# Patient Record
Sex: FEMALE | Race: WHITE | URBAN - METROPOLITAN AREA
[De-identification: names, ages, dates, MRNs, and addresses within clinical notes are randomized per-mention and may not be internally consistent; named-entity substitution may affect disease eponyms.]

---

## 2024-07-18 ENCOUNTER — TELEPHONE (OUTPATIENT)
Dept: FAMILY MEDICINE CLINIC | Facility: CLINIC | Age: 89
End: 2024-07-18

## 2024-07-18 ENCOUNTER — HOSPITAL ENCOUNTER (OUTPATIENT)
Facility: HOSPITAL | Age: 89
Setting detail: SPECIMEN
Discharge: HOME | End: 2024-07-18
Payer: MEDICARE

## 2024-07-18 ENCOUNTER — OFFICE VISIT (OUTPATIENT)
Dept: FAMILY MEDICINE CLINIC | Facility: CLINIC | Age: 89
End: 2024-07-18

## 2024-07-18 VITALS
HEART RATE: 91 BPM | SYSTOLIC BLOOD PRESSURE: 135 MMHG | BODY MASS INDEX: 27.64 KG/M2 | HEIGHT: 63 IN | DIASTOLIC BLOOD PRESSURE: 91 MMHG | RESPIRATION RATE: 16 BRPM | TEMPERATURE: 97.3 F | WEIGHT: 156 LBS | OXYGEN SATURATION: 98 %

## 2024-07-18 DIAGNOSIS — I48.91 ATRIAL FIBRILLATION, UNSPECIFIED TYPE (HCC): Primary | ICD-10-CM

## 2024-07-18 DIAGNOSIS — Z13.29 SCREENING FOR ENDOCRINE DISORDER: ICD-10-CM

## 2024-07-18 DIAGNOSIS — I48.91 ATRIAL FIBRILLATION, UNSPECIFIED TYPE (HCC): ICD-10-CM

## 2024-07-18 DIAGNOSIS — Z91.81 AT HIGH RISK FOR FALLS: ICD-10-CM

## 2024-07-18 DIAGNOSIS — Z76.89 ESTABLISHING CARE WITH NEW DOCTOR, ENCOUNTER FOR: Primary | ICD-10-CM

## 2024-07-18 DIAGNOSIS — I10 PRIMARY HYPERTENSION: ICD-10-CM

## 2024-07-18 DIAGNOSIS — E03.9 HYPOTHYROIDISM, UNSPECIFIED TYPE: ICD-10-CM

## 2024-07-18 DIAGNOSIS — Z13.6 SCREENING FOR CARDIOVASCULAR CONDITION: ICD-10-CM

## 2024-07-18 DIAGNOSIS — Z76.89 ESTABLISHING CARE WITH NEW DOCTOR, ENCOUNTER FOR: ICD-10-CM

## 2024-07-18 LAB
ANION GAP SERPL CALC-SCNC: 9 MMOL/L (ref 3–18)
BUN SERPL-MCNC: 17 MG/DL (ref 7–18)
BUN/CREAT SERPL: 16 (ref 12–20)
CALCIUM SERPL-MCNC: 9.8 MG/DL (ref 8.5–10.1)
CHLORIDE SERPL-SCNC: 105 MMOL/L (ref 100–111)
CHOLEST SERPL-MCNC: 212 MG/DL
CO2 SERPL-SCNC: 25 MMOL/L (ref 21–32)
CREAT SERPL-MCNC: 1.05 MG/DL (ref 0.6–1.3)
CREAT UR-MCNC: 55 MG/DL (ref 30–125)
ERYTHROCYTE [DISTWIDTH] IN BLOOD BY AUTOMATED COUNT: 13.7 % (ref 11.6–14.5)
EST. AVERAGE GLUCOSE BLD GHB EST-MCNC: 137 MG/DL
GLUCOSE SERPL-MCNC: 145 MG/DL (ref 74–99)
HBA1C MFR BLD: 6.4 % (ref 4.2–5.6)
HCT VFR BLD AUTO: 46.8 % (ref 35–45)
HDLC SERPL-MCNC: 40 MG/DL (ref 40–60)
HDLC SERPL: 5.3 (ref 0–5)
HGB BLD-MCNC: 15.2 G/DL (ref 12–16)
LDLC SERPL CALC-MCNC: 119.6 MG/DL (ref 0–100)
LIPID PANEL: ABNORMAL
MCH RBC QN AUTO: 31.2 PG (ref 24–34)
MCHC RBC AUTO-ENTMCNC: 32.5 G/DL (ref 31–37)
MCV RBC AUTO: 96.1 FL (ref 78–100)
MICROALBUMIN UR-MCNC: 1.87 MG/DL (ref 0–3)
MICROALBUMIN/CREAT UR-RTO: 34 MG/G (ref 0–30)
NRBC # BLD: 0 K/UL (ref 0–0.01)
NRBC BLD-RTO: 0 PER 100 WBC
PLATELET # BLD AUTO: 267 K/UL (ref 135–420)
PMV BLD AUTO: 9.9 FL (ref 9.2–11.8)
POTASSIUM SERPL-SCNC: 4.1 MMOL/L (ref 3.5–5.5)
RBC # BLD AUTO: 4.87 M/UL (ref 4.2–5.3)
SODIUM SERPL-SCNC: 139 MMOL/L (ref 136–145)
T4 FREE SERPL-MCNC: 1.1 NG/DL (ref 0.7–1.5)
TRIGL SERPL-MCNC: 262 MG/DL
TSH SERPL DL<=0.05 MIU/L-ACNC: 1.83 UIU/ML (ref 0.36–3.74)
VLDLC SERPL CALC-MCNC: 52.4 MG/DL
WBC # BLD AUTO: 7.3 K/UL (ref 4.6–13.2)

## 2024-07-18 PROCEDURE — 84439 ASSAY OF FREE THYROXINE: CPT

## 2024-07-18 PROCEDURE — 80048 BASIC METABOLIC PNL TOTAL CA: CPT

## 2024-07-18 PROCEDURE — 83036 HEMOGLOBIN GLYCOSYLATED A1C: CPT

## 2024-07-18 PROCEDURE — 85027 COMPLETE CBC AUTOMATED: CPT

## 2024-07-18 PROCEDURE — 80061 LIPID PANEL: CPT

## 2024-07-18 PROCEDURE — 36415 COLL VENOUS BLD VENIPUNCTURE: CPT

## 2024-07-18 PROCEDURE — 84443 ASSAY THYROID STIM HORMONE: CPT

## 2024-07-18 PROCEDURE — 82043 UR ALBUMIN QUANTITATIVE: CPT

## 2024-07-18 PROCEDURE — 82570 ASSAY OF URINE CREATININE: CPT

## 2024-07-18 RX ORDER — LEVOTHYROXINE SODIUM 0.07 MG/1
75 TABLET ORAL DAILY
COMMUNITY

## 2024-07-18 RX ORDER — CARVEDILOL 3.12 MG/1
3.12 TABLET ORAL 2 TIMES DAILY
COMMUNITY
Start: 2024-05-29

## 2024-07-18 RX ORDER — CALCIUM POLYCARBOPHIL 625 MG 625 MG/1
625 TABLET ORAL DAILY
COMMUNITY

## 2024-07-18 RX ORDER — DOCUSATE SODIUM 100 MG/1
100 CAPSULE, LIQUID FILLED ORAL 2 TIMES DAILY
COMMUNITY

## 2024-07-18 RX ORDER — FLUTICASONE PROPIONATE 50 MCG
1 SPRAY, SUSPENSION (ML) NASAL DAILY
COMMUNITY

## 2024-07-18 RX ORDER — LOSARTAN POTASSIUM 25 MG/1
25 TABLET ORAL DAILY
COMMUNITY
Start: 2024-05-13

## 2024-07-18 RX ORDER — LIDOCAINE 50 MG/G
1 PATCH TOPICAL DAILY
COMMUNITY
Start: 2024-06-25 | End: 2024-07-18 | Stop reason: ALTCHOICE

## 2024-07-18 SDOH — ECONOMIC STABILITY: INCOME INSECURITY: HOW HARD IS IT FOR YOU TO PAY FOR THE VERY BASICS LIKE FOOD, HOUSING, MEDICAL CARE, AND HEATING?: NOT HARD AT ALL

## 2024-07-18 SDOH — ECONOMIC STABILITY: HOUSING INSECURITY
IN THE LAST 12 MONTHS, WAS THERE A TIME WHEN YOU DID NOT HAVE A STEADY PLACE TO SLEEP OR SLEPT IN A SHELTER (INCLUDING NOW)?: NO

## 2024-07-18 SDOH — ECONOMIC STABILITY: FOOD INSECURITY: WITHIN THE PAST 12 MONTHS, THE FOOD YOU BOUGHT JUST DIDN'T LAST AND YOU DIDN'T HAVE MONEY TO GET MORE.: NEVER TRUE

## 2024-07-18 SDOH — ECONOMIC STABILITY: FOOD INSECURITY: WITHIN THE PAST 12 MONTHS, YOU WORRIED THAT YOUR FOOD WOULD RUN OUT BEFORE YOU GOT MONEY TO BUY MORE.: NEVER TRUE

## 2024-07-18 ASSESSMENT — PATIENT HEALTH QUESTIONNAIRE - PHQ9
SUM OF ALL RESPONSES TO PHQ QUESTIONS 1-9: 0
SUM OF ALL RESPONSES TO PHQ9 QUESTIONS 1 & 2: 0
1. LITTLE INTEREST OR PLEASURE IN DOING THINGS: NOT AT ALL
SUM OF ALL RESPONSES TO PHQ QUESTIONS 1-9: 0
2. FEELING DOWN, DEPRESSED OR HOPELESS: NOT AT ALL
SUM OF ALL RESPONSES TO PHQ QUESTIONS 1-9: 0
DEPRESSION UNABLE TO ASSESS: FUNCTIONAL CAPACITY MOTIVATION LIMITS ACCURACY
SUM OF ALL RESPONSES TO PHQ QUESTIONS 1-9: 0

## 2024-07-18 ASSESSMENT — ENCOUNTER SYMPTOMS
SINUS PAIN: 0
SHORTNESS OF BREATH: 0
CHEST TIGHTNESS: 0

## 2024-07-18 NOTE — TELEPHONE ENCOUNTER
----- Message from Kamala Naqvi RN sent at 7/18/2024 11:12 AM EDT -----  Regarding: Home Health Referral  Thank you for the referral.      The patient not have a diagnosis that can be used for PT to initiate home health services.  We cannot use risk for falls. We would need to know the etiology. Please add SN to referral for disease management, if appropriate, so the referral can be processed.           Respectfully    Kamala Naqvi MSN RN

## 2024-07-18 NOTE — PROGRESS NOTES
[facility]    HISTORY OF PRESENT ILLNESS  Bette Corado  is a 89 y.o. y.o. female here to establish care.    HTN  -losartan 25    Afib  -carvedilol 3.125, eliquis 2.5  -follows a cardiologist in [place]    Hypothyroid   -synthroid 75    Needs podiatry referral    Health Maintenance:    Cervical Cancer Screen/PAP: n/a  Breast Cancer Screen/Mammogram: n/a  HCV Screen: No results found for: \"HEPCAB\"   DEXA:   No results found for this or any previous visit from the past 3650 days.     Colon Cancer Screening: n/a    Smoking Status:   Tobacco Use      Smoking status: Never      Smokeless tobacco: Never     Lung Cancer Screening:  CT Low Dose n/a     Immunizations:  Flu vaccine- Recommended every fall  COVID vaccine primary series- complete  Tetanus- Tdap   Shingrix- series not completed  RSV-not recommended  Pneumovax 23-  N/A  Prevnar 20- at age 65    Social: lives in [place] in summer, [place] win winter    Mr#: [id number]      Past Medical History:   Diagnosis Date    A-fib (HCC)     Hearing aid worn     Hypertension        History reviewed. No pertinent surgical history.    History reviewed. No pertinent family history.    No Known Allergies    Social History     Tobacco Use   Smoking Status Never    Passive exposure: Never   Smokeless Tobacco Never       Social History     Substance and Sexual Activity   Alcohol Use Not Currently       Immunization History   Administered Date(s) Administered    Pneumococcal, PCV20, PREVNAR 20, (age 6w+), IM, 0.5mL 07/18/2024       There is no problem list on file for this patient.        Current Outpatient Medications:     carvedilol (COREG) 3.125 MG tablet, Take 1 tablet by mouth 2 times daily, Disp: , Rfl:     losartan (COZAAR) 25 MG tablet, Take 1 tablet by mouth daily, Disp: , Rfl:     levothyroxine (SYNTHROID) 75 MCG tablet, Take 1 tablet by mouth Daily, Disp: , Rfl:     fluticasone (FLONASE) 50 MCG/ACT nasal spray, 1 spray by Each Nostril route daily, Disp: , Rfl:

## 2024-07-18 NOTE — PROGRESS NOTES
Immunizations Administered       Name Date Dose Route    Pneumococcal, PCV20, PREVNAR 20, (age 6w+), IM, 0.5mL 7/18/2024 0.5 mL Intramuscular    Site: Deltoid- Left    Lot: [id number]    NDC: 2588-3444-33

## 2024-07-18 NOTE — PROGRESS NOTES
Bette Corado is a 89 y.o. female (: 1934) presenting to address:    Chief Complaint   Patient presents with    New Patient     Legs Feel like they Vibrate and Hurt all the time  [facility] 24 Visit     Stomach Hardness       Vitals:    24 0957   BP: (!) 135/91   Pulse: 91   Resp: 16   Temp: 97.3 °F (36.3 °C)   SpO2: 98%       \"Have you been to the ER, urgent care clinic since your last visit?  Hospitalized since your last visit?\"    Yes, [address] [facility]    “Have you seen or consulted any other health care providers outside of [facility] since your last visit?”    NO

## 2024-07-20 ENCOUNTER — HOME HEALTH ADMISSION (OUTPATIENT)
Age: 89
End: 2024-07-20
Payer: MEDICARE

## 2024-07-21 ENCOUNTER — HOME CARE VISIT (OUTPATIENT)
Age: 89
End: 2024-07-21

## 2024-07-21 VITALS
TEMPERATURE: 97.5 F | HEART RATE: 78 BPM | DIASTOLIC BLOOD PRESSURE: 70 MMHG | RESPIRATION RATE: 18 BRPM | OXYGEN SATURATION: 98 % | SYSTOLIC BLOOD PRESSURE: 120 MMHG

## 2024-07-21 PROCEDURE — 0221000100 HH NO PAY CLAIM PROCEDURE

## 2024-07-21 PROCEDURE — G0151 HHCP-SERV OF PT,EA 15 MIN: HCPCS

## 2024-07-21 NOTE — HOME HEALTH
Skilled services/Home bound verification:     Skilled Reason for admission/summary of clinical condition:  Primary diagnosis of A fib .  This patient is homebound for the following reasons Requires considerable and taxing effort to leave the home .    Caregiver: relative.  Caregiver assists with IADL's.    Medications reconciled and all medications are available in the home this visit.        High risk medication teaching regarding anticoagulants, antiplatelets, antibiotics, antipsychotics, hypoglycemic agents, or opioid/narcotics performed (specify): eliquis for risk of bleeding     Raegan Joel DO notified of any discrepancies/look a like medications/medication interactions n/a    Home health supplies by type and quantity ordered/delivered this visit include: PT sent request for prescription for a rollator to MD via order tab     Pt/Caregiver instructed on plan of care and are agreeable to plan of care at this time.      Physician Raegan Joel DO notified of patient admission to home health and plan of care including anticipated frequency of 3w1 2w3 for PT     Discharge planning discussed with patient and caregiver.  Discharge planning as follows: dc when all goals are met .  Pt/Caregiver did verbalize understanding of discharge planning        PMHx: Primary hypertension   Screening for endocrine disorder  Atrial fibrillation, unspecified type (HCC)  At high risk for falls   Hypothyroidism, unspecified type   Screening for cardiovascular condition  At high risk for falls      SUBJECTIVE: episodes of falls, difficulty with walking, easily fatigued after shirt period of activity   LIVING SITUATION/PLOF: Lives with daughter in a 2 level house, prior to referral, she was independent with basic ADL's and IADL's and was not using any AD for gait   CAREGIVER INVOLVEMENT/ ASSISTANCE NEEDED FOR:daughter for transportation, meal prep, S with mobility/ADL's  MEDICATIONS REVIEWED AND UPDATED:eliquis

## 2024-07-22 ENCOUNTER — TELEPHONE (OUTPATIENT)
Dept: FAMILY MEDICINE CLINIC | Facility: CLINIC | Age: 89
End: 2024-07-22

## 2024-07-22 NOTE — TELEPHONE ENCOUNTER
----- Message from Bridget London LPN sent at 7/20/2024 11:07 AM EDT -----  Regarding: RE: Home Health Referral  Referral Processed    ----- Message -----  From: Kamala Naqvi RN  Sent: 7/18/2024  11:16 AM EDT  To: Raegan Joel, DO; Bridget London LPN; #  Subject: Home Health Referral                             Thank you for the referral.      The patient not have a diagnosis that can be used for PT to initiate home health services.  We cannot use risk for falls. We would need to know the etiology. Please add SN to referral for disease management, if appropriate, so the referral can be processed.           Respectfully    Kamala Naqvi MSN RN

## 2024-07-25 ENCOUNTER — HOME CARE VISIT (OUTPATIENT)
Age: 89
End: 2024-07-25

## 2024-07-25 VITALS
RESPIRATION RATE: 18 BRPM | OXYGEN SATURATION: 97 % | TEMPERATURE: 98.6 F | DIASTOLIC BLOOD PRESSURE: 80 MMHG | SYSTOLIC BLOOD PRESSURE: 140 MMHG | HEART RATE: 80 BPM

## 2024-07-25 PROCEDURE — G0157 HHC PT ASSISTANT EA 15: HCPCS

## 2024-07-25 NOTE — HOME HEALTH
SUBJECTIVE: Patient reported feeling good this morning and had no c/o pain.  CAREGIVER INVOLVEMENT/ASSISTANCE NEEDED FOR: Patient's daughter, Yaneli, assists with transportation, ADLs, and IADLS  HOME HEALTH SUPPLIES BY TYPE AND QUANTITY ORDERED/DELIVERED THIS VISIT INCLUDE: None needed for this visit.  OBJECTIVE:  See interventions.  PATIENT RESPONSE TO TREATMENT: Patient had no c/o pain after walking around her home.  PATIENT LEVEL OF UNDERSTANDING OF EDUCATION PROVIDED: Patient/pt's daughter verbalized understanding on anticoagulant meds, fall prevention, and pain management techniques.  ASSESSMENT OF PROGRESS TOWARD GOALS: Patient addressed goals for gait, transfers, and strength this visit. Pt needed SBA for gait training to ambulate safely. Patient ambulated with decreased james, decreased foot clearance, and forward flexed trunk. Gave multiple vc's for pt to lift B foot higher to clear floor safely and to maintain erect posture. Pt needs reinforcement for safety with gait and to improve posture. Pt also needed SBA to perform sit < > stand transfers from her living room chair. Gave vc's for pt to lean forward and to push off using B UE to stand up. Pt needs reinforcement to improve transfer technique. SpO2 = 99% afterwards. In addition, instructed pt on performing seated therapeutic exercises and to comply with HEP.   CONTINUED NEED FOR THE FOLLOWING SKILLS: Gait Training, Stairs Training, Transfer Training, Bed Mobility Training, Balance Training, ROM, and Therapeutic Exercises.  PLAN FOR NEXT VISIT: Patient will be seen 1w1 2w3 to increase safety with gait, to increase safety with stairs, to improve transfer technique, to improve bed mobility technique, to improve balance, and to increase strength of B LE.  DISCHARGE PLANNING DISCUSSED WITH PATIENT/CAREGIVER: Discharge patient to family with MD supervision when all goals are met. Pt/Caregiver did verbalize understanding of discharge planning.

## 2024-07-27 ENCOUNTER — HOME CARE VISIT (OUTPATIENT)
Age: 89
End: 2024-07-27

## 2024-07-30 ENCOUNTER — HOME CARE VISIT (OUTPATIENT)
Age: 89
End: 2024-07-30
Payer: MEDICARE

## 2024-07-30 PROCEDURE — G0157 HHC PT ASSISTANT EA 15: HCPCS

## 2024-07-31 VITALS
HEART RATE: 65 BPM | SYSTOLIC BLOOD PRESSURE: 118 MMHG | RESPIRATION RATE: 18 BRPM | DIASTOLIC BLOOD PRESSURE: 60 MMHG | OXYGEN SATURATION: 95 % | TEMPERATURE: 98.2 F

## 2024-07-31 NOTE — HOME HEALTH
SUBJECTIVE: Patient reported feeling good and had a great surprise birthday party on Saturday.  CAREGIVER INVOLVEMENT/ASSISTANCE NEEDED FOR: Patient's daughter, Yaneli, assists with transportation, ADLs, and IADLS  HOME HEALTH SUPPLIES BY TYPE AND QUANTITY ORDERED/DELIVERED THIS VISIT INCLUDE: None needed for this visit.  OBJECTIVE:  See interventions.  PATIENT RESPONSE TO TREATMENT: Patient had no c/o pain after walking, performing transfers, and exercising.   PATIENT LEVEL OF UNDERSTANDING OF EDUCATION PROVIDED: Patient/pt's daughter verbalized understanding on anticoagulant meds, fall prevention, and pain management techniques.  ASSESSMENT OF PROGRESS TOWARD GOALS: Patient addressed goals for gait, transfers, and strength this visit. Pt continues to need SBA to ambulate safely but progressed to walking longer distanc for gait training today with no AD. Awaiting arrival of DME ordered by PT. Patient ambulated with decreased james, decreased foot clearance, and forward flexed trunk. Gave multiple vc's for pt to lift B foot higher to clear floor safely and to maintain erect posture. Pt needs reinforcement for safety with gait and to improve posture. Pt also previously needed SBA to perform sit < > stand transfers but progressed to Supervision for transfer training from her living room chair. Gave vc's for pt to lean forward and to push off using B UE to stand up. Pt demonstrated improved transfer technique after giving verbal cues. In addition, pt performed 1 set of 10 reps of seated therapeutic exercises. Reinforced compliance with HEP.   CONTINUED NEED FOR THE FOLLOWING SKILLS: Gait Training, Stairs Training, Transfer Training, Bed Mobility Training, Balance Training, ROM, and Therapeutic Exercises.  PLAN FOR NEXT VISIT: Patient will be seen 1w1 2w3 to increase safety with gait, to increase safety with stairs, to improve transfer technique, to improve bed mobility technique, to improve balance, and to increase

## 2024-08-02 ENCOUNTER — HOME CARE VISIT (OUTPATIENT)
Age: 89
End: 2024-08-02
Payer: MEDICARE

## 2024-08-02 VITALS
DIASTOLIC BLOOD PRESSURE: 82 MMHG | HEART RATE: 83 BPM | TEMPERATURE: 97.9 F | OXYGEN SATURATION: 99 % | SYSTOLIC BLOOD PRESSURE: 120 MMHG | RESPIRATION RATE: 18 BRPM

## 2024-08-02 PROCEDURE — G0157 HHC PT ASSISTANT EA 15: HCPCS

## 2024-08-07 ENCOUNTER — HOME CARE VISIT (OUTPATIENT)
Age: 89
End: 2024-08-07
Payer: MEDICARE

## 2024-08-07 VITALS
OXYGEN SATURATION: 96 % | SYSTOLIC BLOOD PRESSURE: 124 MMHG | TEMPERATURE: 98.6 F | HEART RATE: 60 BPM | DIASTOLIC BLOOD PRESSURE: 72 MMHG

## 2024-08-07 PROCEDURE — G0157 HHC PT ASSISTANT EA 15: HCPCS

## 2024-08-07 NOTE — HOME HEALTH
SUBJECTIVE: \" I feel the best I have these past few days.\" Voiced eating and drinking good. No issues with BM. Patient feels that overall therapy is helping with her amb, but her balance is still an issue.    CAREGIVER INVOLVEMENT/ASSISTANCE NEEDED FOR: Daughter in the home for treatment and assists with shopping, cleaning, meals and transportation.  .  OBJECTIVE:  See interventions.    PATIENT EDUCATION PROVIDED THIS VISIT: 1. Continue with HEP 2 times a day. 2. Amb household distances several times during the day when up. 3. Stand for a sec or 2 prior to amb to make sure she feels steady and is not dizzy. 4. Limit going up and down the steps in the home for fall prevention. 5. Use medications as directed.    PATIENT RESPONSE TO EDUCATION PROVIDED: Voiced that she understands and that she doesn't want to fall again.    PATIENT RESPONSE TO TREATMENT: Patient voiced feeling unsteady with the side stepping and that it made her dizzy from looking down.  .  ASSESSMENT OF PROGRESS TOWARD GOALS: Patient needs therapy for higher balance drills and to practice outside amb. Patient was able to perform standing exercises with single hand support, but had difficulty with side stepping drills. Focus will continue on amb technique with foot placement and (L) LE strengthening to prevent giving and falls.  .    PLAN FOR NEXT VISIT: Car transfer and outside amb.    THE FOLLOWING DISCHARGE PLANNING WAS DISCUSSED WITH THE PATIENT/CAREGIVER: Discussed 2nd visit this week for Friday and patient was agreeable. Discussed dc plans to self with HEP and following MD instructions.

## 2024-08-09 ENCOUNTER — HOME CARE VISIT (OUTPATIENT)
Age: 89
End: 2024-08-09
Payer: MEDICARE

## 2024-08-09 PROCEDURE — G0157 HHC PT ASSISTANT EA 15: HCPCS

## 2024-08-11 ENCOUNTER — HOME CARE VISIT (OUTPATIENT)
Age: 89
End: 2024-08-11
Payer: MEDICARE

## 2024-08-11 PROCEDURE — G0157 HHC PT ASSISTANT EA 15: HCPCS

## 2024-08-12 ENCOUNTER — HOME CARE VISIT (OUTPATIENT)
Age: 89
End: 2024-08-12
Payer: MEDICARE

## 2024-08-12 VITALS
RESPIRATION RATE: 14 BRPM | HEART RATE: 86 BPM | SYSTOLIC BLOOD PRESSURE: 126 MMHG | TEMPERATURE: 97.7 F | OXYGEN SATURATION: 97 % | DIASTOLIC BLOOD PRESSURE: 84 MMHG

## 2024-08-12 VITALS
TEMPERATURE: 97.4 F | DIASTOLIC BLOOD PRESSURE: 82 MMHG | HEART RATE: 85 BPM | RESPIRATION RATE: 17 BRPM | SYSTOLIC BLOOD PRESSURE: 130 MMHG | OXYGEN SATURATION: 97 %

## 2024-08-12 PROCEDURE — G0151 HHCP-SERV OF PT,EA 15 MIN: HCPCS

## 2024-08-12 NOTE — HOME HEALTH
S: patient reports that she still feels off balance and still tends to lose her balance -she denies falls  O: PAIN: patient denied pain   ROM: B LE hip flexion, abduction and adduction WFL   B knee flexion, extension WFL   STRENGTH: R knee ext 4/5, R knee flexion 4/5, R hip flex 4-/5, R hip abd/adduction 4-/5  L knee flexion and extension 4/5, L hip flexion/abduction and adduction 4-/5  BED MOBILITY: not tested today but she reports independence with bed mobility    TRANSFERS: patient completed sit to stand from the living room arm chair with SBA but she demonstrated loss of balance on initial stance as she is keeping all of her body weight posteriorly on her heels on initial stance. She also had a narrow base of support. she is able to control her stand to sit transition with good control.  Max cues for correct weight distribution through the balls of her feet for added stability and to maintain a 4-6 inch base of support for further added stability.  GAIT: ambulating with without a device on even and uneven surfaces with SBA - she demonstrates inconsistent foot placement, variations in base of support, deviations from a straight path and instability. Her instability increases with unevem surfaces - especially the inclined driveeway she needs to navigate to get to her daughters car.   She was educated at length on maintaining a 4-6 inch base of support and thinking about wearing more supportive shoes. She was wearing thin shoes without any support - she reports that she does have more supportive shoes that she can try to add stability.  There is a rollator on order for her and it will be delivered Monday 8/19/24 per her daughters report.  STEPS: up and down a flight of steps holding 2 railings and alternating feet with good safety. she comes down sideways due to reduced eccentric control and comfort   BALANCE: tinetti 21/28 moderate fall risk   RESPONSE TO TREATMENT: patient demonstrated a positive outcome post

## 2024-08-13 VITALS — HEART RATE: 91 BPM | TEMPERATURE: 98.5 F | DIASTOLIC BLOOD PRESSURE: 116 MMHG | SYSTOLIC BLOOD PRESSURE: 138 MMHG

## 2024-08-13 NOTE — HOME HEALTH
SUBJECTIVE: \"I am just sore, but no pain.\" Voiced still eating well and taking in fluids. No BM issues. No changes in meds. No falls, just dizziness when moving to fast.     CAREGIVER INVOLVEMENT/ASSISTANCE NEEDED FOR: Daughter is present in the home to assist with cleaning, transportation, and meals.  .  OBJECTIVE:  See interventions.    PATIENT EDUCATION PROVIDED THIS VISIT: 1. Increase water intake. 2. Rest with LEs elevated. 3. Check BP 2 times a day once in the AM and once in the PM. 4. Follow up with MD if BP remains elevated.    PATIENT RESPONSE TO EDUCATION PROVIDED: Stated that she will elevate her feet, but that her foot stool gets moved around by the great grandkids. Stated she is not feeling any symptoms of her BP being elevated.    PATIENT RESPONSE TO TREATMENT: Voiced that she is feeling good, but understands the education about her BP.  .  ASSESSMENT OF PROGRESS TOWARD GOALS: Patient had high BP reading this session. Education was provided and verified reading with patient's home BP cuff. Placed call to MD to notify.  .  PLAN FOR NEXT VISIT: Practice outside amb and car transfer.    THE FOLLOWING DISCHARGE PLANNING WAS DISCUSSED WITH THE PATIENT/CAREGIVER: Plan is for a visit on Sunday and then DC on Monday with PT, Christina. Will see how visit on Sunday goes as we may need to extend PT for a couple of visits. Patient and daughter is agreeable to set plan.

## 2024-08-13 NOTE — HOME HEALTH
Subjective: Patient relays that she has been able to navigate the home well in general and today she feels better than she did and thinks that her blood pressure has been better. She states that she has good and band days it seems and that she occasionally can feel a flutter. She denies any recent pain in general when asked today.     Objective: Skilled home health physical therapy interventions completed today listed in care plan section.  Interventions performed today to assist in return to prior level of function, address deficits as apparent upon time of initial evaluation, return to community and personal activities, and progress further towards goals as previously established in plan of care. There are not any changes to medications upon timing of this visit. Home health supplies were not ordered/delivered today. Blood pressure and heart rate monitored throughout visit pre and post activity with measurements staying within agency standards today. Patient does not have Rolator walker at the home at this time as has still not been delivered. 6 minute walk test today 289 meters.     Assessment:  Patient is slowly progressing towards goals as previously established in POC with skilled home health physical therapy services at this time as made apparent by ability to perform car transfer well today following education for appropriate form and safety awareness. She needed minimal assist for self righting during gait training today as she does not have her assistive device yet for support. Noted continued lateral hip weakness and limited balance/proprioception. Family/caregiver daughter and other family involvement is present/set-up at this point in time and assists with meals, transport, ADLs and transfers as needed, and general encouragement.     Plan:  Discharge planning discussed at this visit regarding eventual discharge once patient has met goals and/or met maximum benefit from home health skilled PT services

## 2024-08-20 ENCOUNTER — HOME CARE VISIT (OUTPATIENT)
Age: 89
End: 2024-08-20
Payer: MEDICARE

## 2024-08-20 PROCEDURE — G0157 HHC PT ASSISTANT EA 15: HCPCS

## 2024-08-22 VITALS
SYSTOLIC BLOOD PRESSURE: 142 MMHG | OXYGEN SATURATION: 97 % | TEMPERATURE: 98.5 F | HEART RATE: 74 BPM | DIASTOLIC BLOOD PRESSURE: 92 MMHG

## 2024-08-22 ASSESSMENT — ENCOUNTER SYMPTOMS: PAIN LOCATION - PAIN QUALITY: ACHES

## 2024-08-22 NOTE — HOME HEALTH
SUBJECTIVE: ' My rollator came in finally.\" Voiced that she has been doing the exercises. Voiced no changes in med's and no issues with BM. Denies any falls since last visit.    CAREGIVER INVOLVEMENT/ASSISTANCE NEEDED FOR: Daughter and Son-In-Law present in the home. Assist with meals, laundry and transportation.    OBJECTIVE:  See interventions.    PATIENT EDUCATION PROVIDED THIS VISIT: 1. Use rollator for amb to reduce risk of falling. 2. Perform HEP 2 times a day. 3. Monitor BP daily. 4. Try adding new activities during the day.    PATIENT RESPONSE TO EDUCATION PROVIDED: Stated that she is performing HEP and using the stationary pedal bike.    PATIENT RESPONSE TO TREATMENT: Improved breathing technique with rollator use outside.  .  ASSESSMENT OF PROGRESS TOWARD GOALS: Started outside amb training this session with the rollator. Need to practice to decrease cues given for locking brakes and staying closer to the rollator.(A) needed for rollator on the steps do to bulkiness of the rollator and patient's need to hold onto the handrail. Rollator use allowed patient to improve breathing technique and decrease SOB. Further therapy is needed for rollator training.  .  PLAN FOR NEXT VISIT: Amb outside and further practice on the steps.    THE FOLLOWING DISCHARGE PLANNING WAS DISCUSSED WITH THE PATIENT/CAREGIVER:Started dc planning with patient and daughter. Both are in agreement to remaining visits and then dc to HEP and MD instructions.

## 2024-08-23 ENCOUNTER — HOME CARE VISIT (OUTPATIENT)
Age: 89
End: 2024-08-23
Payer: MEDICARE

## 2024-08-23 PROCEDURE — G0157 HHC PT ASSISTANT EA 15: HCPCS

## 2024-08-25 VITALS
SYSTOLIC BLOOD PRESSURE: 156 MMHG | DIASTOLIC BLOOD PRESSURE: 115 MMHG | OXYGEN SATURATION: 97 % | TEMPERATURE: 98.3 F | HEART RATE: 68 BPM

## 2024-08-26 ENCOUNTER — HOME CARE VISIT (OUTPATIENT)
Age: 89
End: 2024-08-26
Payer: MEDICARE

## 2024-08-26 ENCOUNTER — TELEPHONE (OUTPATIENT)
Dept: FAMILY MEDICINE CLINIC | Facility: CLINIC | Age: 89
End: 2024-08-26

## 2024-08-26 VITALS
DIASTOLIC BLOOD PRESSURE: 117 MMHG | SYSTOLIC BLOOD PRESSURE: 151 MMHG | TEMPERATURE: 97.7 F | HEART RATE: 76 BPM | OXYGEN SATURATION: 97 %

## 2024-08-26 PROCEDURE — G0157 HHC PT ASSISTANT EA 15: HCPCS

## 2024-08-26 NOTE — HOME HEALTH
SUBJECTIVE: \" I really have felt like my self the past few days. It feels good to be back to normal. I went with my daughter to the grocery store on Saturday. It was titering, but I was able to do it. She parked in the handicap section and I pushed the cart.\"  Denies any changes to medications, eating good, and no issues with BM's.    CAREGIVER INVOLVEMENT/ASSISTANCE NEEDED FOR: Daughter is present in the home and for the PT visit. Discussed BP reading with patient's daughter.   .  OBJECTIVE:  See interventions.    PATIENT EDUCATION PROVIDED THIS VISIT: 1. Check BP several times a day. 2. Continue on outing with daughter. 3. Perform HEP at least daily.    PATIENT RESPONSE TO EDUCATION PROVIDED: Agreed to checking BPs.    PATIENT RESPONSE TO TREATMENT: Had patient place her own BP cuff on her arm and check the reading. Patient's reading was the same as PTA's reading. Looked at patient's reading from over the weekend and diastolic reading were below 100 in the low 90s. No pitting edema in (B) LEs upon inspection.   .  ASSESSMENT OF PROGRESS TOWARD GOALS: Called patient's MD office(Dr. Joel) to discuss elevated BP readings. Message was taken and sent to the nurse of Dr. Joel. Patient is in agreement that she has met goals and doing better and is able to exit home with daughter. Patient has met goals by:  (I) with household transfers with use of UE support.  (I) on the steps in the house using handrail and step to pattern.  (I) amb in the home without (A) device with fair stride length and james. Cues for UE swing and upright posture.  Tinetti-24/28    PLAN FOR NEXT VISIT: Reassessment that goals were met prior to dc.    THE FOLLOWING DISCHARGE PLANNING WAS DISCUSSED WITH THE PATIENT/CAREGIVER: Called PT to update on BP reading and call to MD. DC to HEP and MD instructions.

## 2024-08-26 NOTE — TELEPHONE ENCOUNTER
[facility], Madiha, calling about patient's blood pressure readings.  They have been high.  One reading was 156/110, 136/109, 150/110.      But over the weekend the reading was more normal at 126/92.  So Home health was concerned that the readings are elevated.  The patient does have an appointment September 18, but home health would like a call before then.      The nurse is asking if she should hold therapy today or continue care.

## 2024-08-27 ENCOUNTER — HOME CARE VISIT (OUTPATIENT)
Age: 89
End: 2024-08-27
Payer: MEDICARE

## 2024-08-27 NOTE — TELEPHONE ENCOUNTER
Patient is doing excellent and will be discharged from PT on Friday.     Shukri verbalized understanding that Dr. Joel will address and potentially switch BP medications at next in office visit.

## 2024-08-30 ENCOUNTER — HOME CARE VISIT (OUTPATIENT)
Age: 89
End: 2024-08-30
Payer: MEDICARE

## 2024-08-30 VITALS
DIASTOLIC BLOOD PRESSURE: 74 MMHG | SYSTOLIC BLOOD PRESSURE: 118 MMHG | OXYGEN SATURATION: 98 % | HEART RATE: 81 BPM | RESPIRATION RATE: 16 BRPM | TEMPERATURE: 98.4 F

## 2024-08-30 PROCEDURE — G0151 HHCP-SERV OF PT,EA 15 MIN: HCPCS

## 2024-08-30 NOTE — HOME HEALTH
S: patient reports that she is doing great  O: PAIN: patient denied pain   ROM: B LE hip flexion, abduction and adduction WFL   B knee flexion, extension WFL   STRENGTH: R knee ext 4+/5, R knee flexion 4+/5, R hip flex 4/5, R hip abd/adduction 4+/5  L knee flexion and extension 4+/5, L hip flexion 4/5 abduction and adduction 4+/5  BED MOBILITY: independence  TRANSFERS:independent with all transfers  GAIT: She is ambulating without an assistive device with independence on even and uneven surfaces. Her gait is symmetrical and james is normal  There is a rollator on order for her and it will be delivered Monday 8/19/24 per her daughters report.  STEPS: up and down a flight of steps holding 2 railings and alternating feet with good safety and independence   BALANCE: tinetti 21/28 moderate fall risk at reassessment  tinetti 28/28 at discharge  greater than a 4 point increase in the tinetti score indicates a statistically significant reduction in fall risk  RESPONSE TO TREATMENT: patient demonstrated a positive outcome post treatment and reported a reduction in pain, increased comfort and increased confidence with transfers and mobility. Patient reported good understanding of the HEP and reports confidence in ability to complete the program as prescribed by PT  RESPONSE TO EDUCATION: patient was educated on: safety, HEP  Patient expressed understanding of all education provided and was able to repeat back education, precautions, and HEP.  A:ASSESSMENT AND PROGRESS TOWARD GOALS: Patient was seen for home care PT following a referral for atrial fibrillation. She has done very well and has made great progress in all areas. She is currently independent with all bed mobility and transfers in the home. She is ambulating without an assistive device with independence on even and uneven surfaces. Her gait is symmetrical and james is normal. She is able to ascend and descend a flight of steps with independence and use of a

## 2024-09-18 ENCOUNTER — TELEPHONE (OUTPATIENT)
Dept: FAMILY MEDICINE CLINIC | Facility: CLINIC | Age: 89
End: 2024-09-18

## 2024-09-18 ENCOUNTER — OFFICE VISIT (OUTPATIENT)
Dept: FAMILY MEDICINE CLINIC | Facility: CLINIC | Age: 89
End: 2024-09-18
Payer: MEDICARE

## 2024-09-18 VITALS
OXYGEN SATURATION: 96 % | WEIGHT: 157 LBS | HEIGHT: 63 IN | HEART RATE: 67 BPM | DIASTOLIC BLOOD PRESSURE: 93 MMHG | TEMPERATURE: 97.2 F | SYSTOLIC BLOOD PRESSURE: 142 MMHG | BODY MASS INDEX: 27.82 KG/M2 | RESPIRATION RATE: 14 BRPM

## 2024-09-18 DIAGNOSIS — I10 PRIMARY HYPERTENSION: ICD-10-CM

## 2024-09-18 DIAGNOSIS — Z23 NEED FOR PROPHYLACTIC VACCINATION AND INOCULATION AGAINST VARICELLA: ICD-10-CM

## 2024-09-18 DIAGNOSIS — I48.91 ATRIAL FIBRILLATION, UNSPECIFIED TYPE (HCC): ICD-10-CM

## 2024-09-18 DIAGNOSIS — Z23 NEED FOR PROPHYLACTIC VACCINATION AGAINST DIPHTHERIA-TETANUS-PERTUSSIS (DTP): ICD-10-CM

## 2024-09-18 DIAGNOSIS — Z71.89 ACP (ADVANCE CARE PLANNING): ICD-10-CM

## 2024-09-18 DIAGNOSIS — K59.04 CHRONIC IDIOPATHIC CONSTIPATION: ICD-10-CM

## 2024-09-18 DIAGNOSIS — R73.03 PREDIABETES: ICD-10-CM

## 2024-09-18 DIAGNOSIS — Z00.00 MEDICARE ANNUAL WELLNESS VISIT, SUBSEQUENT: Primary | ICD-10-CM

## 2024-09-18 PROCEDURE — G8427 DOCREV CUR MEDS BY ELIG CLIN: HCPCS | Performed by: STUDENT IN AN ORGANIZED HEALTH CARE EDUCATION/TRAINING PROGRAM

## 2024-09-18 PROCEDURE — 1123F ACP DISCUSS/DSCN MKR DOCD: CPT | Performed by: STUDENT IN AN ORGANIZED HEALTH CARE EDUCATION/TRAINING PROGRAM

## 2024-09-18 PROCEDURE — 1036F TOBACCO NON-USER: CPT | Performed by: STUDENT IN AN ORGANIZED HEALTH CARE EDUCATION/TRAINING PROGRAM

## 2024-09-18 PROCEDURE — G0439 PPPS, SUBSEQ VISIT: HCPCS | Performed by: STUDENT IN AN ORGANIZED HEALTH CARE EDUCATION/TRAINING PROGRAM

## 2024-09-18 PROCEDURE — G8419 CALC BMI OUT NRM PARAM NOF/U: HCPCS | Performed by: STUDENT IN AN ORGANIZED HEALTH CARE EDUCATION/TRAINING PROGRAM

## 2024-09-18 PROCEDURE — 1090F PRES/ABSN URINE INCON ASSESS: CPT | Performed by: STUDENT IN AN ORGANIZED HEALTH CARE EDUCATION/TRAINING PROGRAM

## 2024-09-18 PROCEDURE — 99497 ADVNCD CARE PLAN 30 MIN: CPT | Performed by: STUDENT IN AN ORGANIZED HEALTH CARE EDUCATION/TRAINING PROGRAM

## 2024-09-18 PROCEDURE — 99214 OFFICE O/P EST MOD 30 MIN: CPT | Performed by: STUDENT IN AN ORGANIZED HEALTH CARE EDUCATION/TRAINING PROGRAM

## 2024-09-18 RX ORDER — METFORMIN HCL 500 MG
500 TABLET, EXTENDED RELEASE 24 HR ORAL
Qty: 60 TABLET | Refills: 1 | Status: SHIPPED | OUTPATIENT
Start: 2024-09-18

## 2024-09-18 RX ORDER — POLYETHYLENE GLYCOL 3350 17 G/17G
17 POWDER, FOR SOLUTION ORAL 2 TIMES DAILY
Qty: 1530 G | Refills: 1 | Status: SHIPPED | OUTPATIENT
Start: 2024-09-18 | End: 2024-10-18

## 2024-09-18 ASSESSMENT — LIFESTYLE VARIABLES
HOW MANY STANDARD DRINKS CONTAINING ALCOHOL DO YOU HAVE ON A TYPICAL DAY: PATIENT DOES NOT DRINK
HOW OFTEN DO YOU HAVE A DRINK CONTAINING ALCOHOL: NEVER

## 2024-09-18 ASSESSMENT — PATIENT HEALTH QUESTIONNAIRE - PHQ9
SUM OF ALL RESPONSES TO PHQ QUESTIONS 1-9: 0
1. LITTLE INTEREST OR PLEASURE IN DOING THINGS: NOT AT ALL
SUM OF ALL RESPONSES TO PHQ9 QUESTIONS 1 & 2: 0
SUM OF ALL RESPONSES TO PHQ QUESTIONS 1-9: 0
2. FEELING DOWN, DEPRESSED OR HOPELESS: NOT AT ALL

## 2024-09-18 ASSESSMENT — ENCOUNTER SYMPTOMS
CHEST TIGHTNESS: 0
SINUS PAIN: 0
SHORTNESS OF BREATH: 0

## 2024-10-09 ENCOUNTER — OFFICE VISIT (OUTPATIENT)
Dept: FAMILY MEDICINE CLINIC | Facility: CLINIC | Age: 89
End: 2024-10-09
Payer: MEDICARE

## 2024-10-09 VITALS
DIASTOLIC BLOOD PRESSURE: 84 MMHG | BODY MASS INDEX: 27.46 KG/M2 | OXYGEN SATURATION: 97 % | WEIGHT: 155 LBS | TEMPERATURE: 97.2 F | RESPIRATION RATE: 16 BRPM | SYSTOLIC BLOOD PRESSURE: 130 MMHG | HEIGHT: 63 IN | HEART RATE: 94 BPM

## 2024-10-09 DIAGNOSIS — I10 PRIMARY HYPERTENSION: ICD-10-CM

## 2024-10-09 DIAGNOSIS — I48.91 ATRIAL FIBRILLATION, UNSPECIFIED TYPE (HCC): ICD-10-CM

## 2024-10-09 DIAGNOSIS — R73.03 PREDIABETES: Primary | ICD-10-CM

## 2024-10-09 PROCEDURE — G0008 ADMIN INFLUENZA VIRUS VAC: HCPCS | Performed by: STUDENT IN AN ORGANIZED HEALTH CARE EDUCATION/TRAINING PROGRAM

## 2024-10-09 PROCEDURE — 99214 OFFICE O/P EST MOD 30 MIN: CPT | Performed by: STUDENT IN AN ORGANIZED HEALTH CARE EDUCATION/TRAINING PROGRAM

## 2024-10-09 PROCEDURE — 1090F PRES/ABSN URINE INCON ASSESS: CPT | Performed by: STUDENT IN AN ORGANIZED HEALTH CARE EDUCATION/TRAINING PROGRAM

## 2024-10-09 PROCEDURE — 1123F ACP DISCUSS/DSCN MKR DOCD: CPT | Performed by: STUDENT IN AN ORGANIZED HEALTH CARE EDUCATION/TRAINING PROGRAM

## 2024-10-09 PROCEDURE — G8482 FLU IMMUNIZE ORDER/ADMIN: HCPCS | Performed by: STUDENT IN AN ORGANIZED HEALTH CARE EDUCATION/TRAINING PROGRAM

## 2024-10-09 PROCEDURE — G8427 DOCREV CUR MEDS BY ELIG CLIN: HCPCS | Performed by: STUDENT IN AN ORGANIZED HEALTH CARE EDUCATION/TRAINING PROGRAM

## 2024-10-09 PROCEDURE — 90653 IIV ADJUVANT VACCINE IM: CPT | Performed by: STUDENT IN AN ORGANIZED HEALTH CARE EDUCATION/TRAINING PROGRAM

## 2024-10-09 PROCEDURE — 1036F TOBACCO NON-USER: CPT | Performed by: STUDENT IN AN ORGANIZED HEALTH CARE EDUCATION/TRAINING PROGRAM

## 2024-10-09 PROCEDURE — G8419 CALC BMI OUT NRM PARAM NOF/U: HCPCS | Performed by: STUDENT IN AN ORGANIZED HEALTH CARE EDUCATION/TRAINING PROGRAM

## 2024-10-09 RX ORDER — METFORMIN HCL 500 MG
500 TABLET, EXTENDED RELEASE 24 HR ORAL
Qty: 90 TABLET | Refills: 1 | Status: SHIPPED | OUTPATIENT
Start: 2024-10-09

## 2024-10-09 ASSESSMENT — ENCOUNTER SYMPTOMS
CHEST TIGHTNESS: 0
SHORTNESS OF BREATH: 0
SINUS PAIN: 0

## 2024-10-09 NOTE — PROGRESS NOTES
Bette Corado is a 89 y.o. female (: 1934) presenting to address:    Chief Complaint   Patient presents with    Diabetes    Immunizations     Would like flu shot.        Vitals:    10/09/24 1438   BP: 130/84   Pulse: 94   Resp: 16   Temp: 97.2 °F (36.2 °C)   SpO2: 97%       \"Have you been to the ER, urgent care clinic since your last visit?  Hospitalized since your last visit?\"    NO    “Have you seen or consulted any other health care providers outside of [facility] since your last visit?”    NO    Flu shot Immunization/s administered 10/9/2024 by Nicki Vela LPN   Patient tolerated procedure well.  No reactions noted.            
(GLUCOPHAGE-XR) 500 MG extended release tablet; Take 1 tablet by mouth daily (with breakfast)  -     Influenza, FLUAD Trivalent, (age 65 y+), IM, Preservative Free, 0.5mL                 Kaylynn Montalvo D.O.      PLEASE NOTE:   This document has been produced using voice recognition software. Unrecognized errors in transcription may be present.

## 2025-04-08 DIAGNOSIS — I48.91 ATRIAL FIBRILLATION, UNSPECIFIED TYPE (HCC): ICD-10-CM

## 2025-04-08 NOTE — TELEPHONE ENCOUNTER
Requested Prescriptions     Pending Prescriptions Disp Refills    apixaban (ELIQUIS) 2.5 MG TABS tablet 180 tablet 1     Sig: Take 1 tablet by mouth 2 times daily

## 2025-04-08 NOTE — TELEPHONE ENCOUNTER
Patient is almost out of medication. Patients daughter is aware that provider is out of the office. Will another provider fill medication request

## 2025-04-29 ENCOUNTER — TELEPHONE (OUTPATIENT)
Dept: FAMILY MEDICINE CLINIC | Facility: CLINIC | Age: 89
End: 2025-04-29

## 2025-04-29 NOTE — TELEPHONE ENCOUNTER
----- Message from Janay MICHEL sent at 4/28/2025 11:53 AM EDT -----  Regarding: [facility] Appointment Request  [facility] Appointment Request    Patient needs appointment for [facility] Appointment Type: Existing Condition Follow Up/ 6 months follow-up     Patient Requested Dates(s):    Any date within this week   Patient Requested Time:         Anytime would be fine except for Friday (available after 2PM)  Provider Name:                        Raegan Montalvo DO    Reason for Appointment Request: Established Patient - Available appointments did not meet patient need  --------------------------------------------------------------------------------------------------------------------------    Relationship to Patient: Other -  Lu - daughter    Call Back Information: OK to leave message on voicemail  Preferred Call Back Number: [phone]

## 2025-05-14 DIAGNOSIS — I10 PRIMARY HYPERTENSION: ICD-10-CM

## 2025-05-14 DIAGNOSIS — I48.91 ATRIAL FIBRILLATION, UNSPECIFIED TYPE (HCC): ICD-10-CM

## 2025-05-14 NOTE — TELEPHONE ENCOUNTER
Pt's daughter called requesting refills to last until her next appt.    Requested Prescriptions     Pending Prescriptions Disp Refills    metFORMIN (GLUCOPHAGE-XR) 500 MG extended release tablet 90 tablet 1     Sig: Take 1 tablet by mouth daily (with breakfast)    carvedilol (COREG) 3.125 MG tablet 60 tablet      Sig: Take 1 tablet by mouth 2 times daily    losartan (COZAAR) 25 MG tablet 30 tablet      Sig: Take 1 tablet by mouth daily     Future Appointments   Date Time Provider Department Center   6/11/2025  1:15 PM Raegan Greene DO [facility]

## 2025-05-15 RX ORDER — METFORMIN HYDROCHLORIDE 500 MG/1
500 TABLET, EXTENDED RELEASE ORAL
Qty: 90 TABLET | Refills: 1 | Status: SHIPPED | OUTPATIENT
Start: 2025-05-15

## 2025-05-15 RX ORDER — LOSARTAN POTASSIUM 25 MG/1
25 TABLET ORAL DAILY
Qty: 90 TABLET | Refills: 1 | Status: SHIPPED | OUTPATIENT
Start: 2025-05-15

## 2025-05-15 RX ORDER — CARVEDILOL 3.12 MG/1
3.12 TABLET ORAL 2 TIMES DAILY
Qty: 180 TABLET | Refills: 1 | Status: SHIPPED | OUTPATIENT
Start: 2025-05-15

## 2025-06-11 ENCOUNTER — OFFICE VISIT (OUTPATIENT)
Dept: FAMILY MEDICINE CLINIC | Facility: CLINIC | Age: 89
End: 2025-06-11
Payer: MEDICARE

## 2025-06-11 VITALS
SYSTOLIC BLOOD PRESSURE: 127 MMHG | OXYGEN SATURATION: 97 % | RESPIRATION RATE: 16 BRPM | HEART RATE: 82 BPM | HEIGHT: 63 IN | TEMPERATURE: 98.4 F | BODY MASS INDEX: 26.79 KG/M2 | WEIGHT: 151.2 LBS | DIASTOLIC BLOOD PRESSURE: 79 MMHG

## 2025-06-11 DIAGNOSIS — I48.91 ATRIAL FIBRILLATION, UNSPECIFIED TYPE (HCC): ICD-10-CM

## 2025-06-11 DIAGNOSIS — E03.9 HYPOTHYROIDISM, UNSPECIFIED TYPE: ICD-10-CM

## 2025-06-11 DIAGNOSIS — M25.561 CHRONIC PAIN OF RIGHT KNEE: ICD-10-CM

## 2025-06-11 DIAGNOSIS — R73.03 PREDIABETES: ICD-10-CM
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
## 2025-06-11 DIAGNOSIS — G89.29 CHRONIC PAIN OF RIGHT KNEE: ICD-10-CM

## 2025-06-11 LAB — HBA1C MFR BLD: 6.4 %

## 2025-06-11 PROCEDURE — 1125F AMNT PAIN NOTED PAIN PRSNT: CPT | Performed by: STUDENT IN AN ORGANIZED HEALTH CARE EDUCATION/TRAINING PROGRAM

## 2025-06-11 PROCEDURE — 20610 DRAIN/INJ JOINT/BURSA W/O US: CPT | Performed by: STUDENT IN AN ORGANIZED HEALTH CARE EDUCATION/TRAINING PROGRAM

## 2025-06-11 PROCEDURE — 83036 HEMOGLOBIN GLYCOSYLATED A1C: CPT | Performed by: STUDENT IN AN ORGANIZED HEALTH CARE EDUCATION/TRAINING PROGRAM

## 2025-06-11 PROCEDURE — 1090F PRES/ABSN URINE INCON ASSESS: CPT | Performed by: STUDENT IN AN ORGANIZED HEALTH CARE EDUCATION/TRAINING PROGRAM

## 2025-06-11 PROCEDURE — G8427 DOCREV CUR MEDS BY ELIG CLIN: HCPCS | Performed by: STUDENT IN AN ORGANIZED HEALTH CARE EDUCATION/TRAINING PROGRAM

## 2025-06-11 PROCEDURE — 1036F TOBACCO NON-USER: CPT | Performed by: STUDENT IN AN ORGANIZED HEALTH CARE EDUCATION/TRAINING PROGRAM

## 2025-06-11 PROCEDURE — 1160F RVW MEDS BY RX/DR IN RCRD: CPT | Performed by: STUDENT IN AN ORGANIZED HEALTH CARE EDUCATION/TRAINING PROGRAM

## 2025-06-11 PROCEDURE — 1159F MED LIST DOCD IN RCRD: CPT | Performed by: STUDENT IN AN ORGANIZED HEALTH CARE EDUCATION/TRAINING PROGRAM

## 2025-06-11 PROCEDURE — 99215 OFFICE O/P EST HI 40 MIN: CPT | Performed by: STUDENT IN AN ORGANIZED HEALTH CARE EDUCATION/TRAINING PROGRAM

## 2025-06-11 PROCEDURE — G8419 CALC BMI OUT NRM PARAM NOF/U: HCPCS | Performed by: STUDENT IN AN ORGANIZED HEALTH CARE EDUCATION/TRAINING PROGRAM

## 2025-06-11 PROCEDURE — 1123F ACP DISCUSS/DSCN MKR DOCD: CPT | Performed by: STUDENT IN AN ORGANIZED HEALTH CARE EDUCATION/TRAINING PROGRAM

## 2025-06-11 RX ORDER — LEVOTHYROXINE SODIUM 75 UG/1
75 TABLET ORAL DAILY
Qty: 90 TABLET | Refills: 1 | Status: SHIPPED | OUTPATIENT
Start: 2025-06-11

## 2025-06-11 RX ORDER — TRIAMCINOLONE ACETONIDE 40 MG/ML
40 INJECTION, SUSPENSION INTRA-ARTICULAR; INTRAMUSCULAR ONCE
Status: COMPLETED | OUTPATIENT
Start: 2025-06-11 | End: 2025-06-11

## 2025-06-11 RX ADMIN — TRIAMCINOLONE ACETONIDE 40 MG: 40 INJECTION, SUSPENSION INTRA-ARTICULAR; INTRAMUSCULAR at 14:40

## 2025-06-11 SDOH — ECONOMIC STABILITY: FOOD INSECURITY: WITHIN THE PAST 12 MONTHS, THE FOOD YOU BOUGHT JUST DIDN'T LAST AND YOU DIDN'T HAVE MONEY TO GET MORE.: NEVER TRUE

## 2025-06-11 SDOH — ECONOMIC STABILITY: FOOD INSECURITY: WITHIN THE PAST 12 MONTHS, YOU WORRIED THAT YOUR FOOD WOULD RUN OUT BEFORE YOU GOT MONEY TO BUY MORE.: NEVER TRUE

## 2025-06-11 ASSESSMENT — PATIENT HEALTH QUESTIONNAIRE - PHQ9
2. FEELING DOWN, DEPRESSED OR HOPELESS: NOT AT ALL
SUM OF ALL RESPONSES TO PHQ QUESTIONS 1-9: 0
SUM OF ALL RESPONSES TO PHQ QUESTIONS 1-9: 0
1. LITTLE INTEREST OR PLEASURE IN DOING THINGS: NOT AT ALL
SUM OF ALL RESPONSES TO PHQ QUESTIONS 1-9: 0
SUM OF ALL RESPONSES TO PHQ QUESTIONS 1-9: 0

## 2025-06-11 ASSESSMENT — ENCOUNTER SYMPTOMS
SHORTNESS OF BREATH: 0
CHEST TIGHTNESS: 0
SINUS PAIN: 0

## 2025-06-11 NOTE — PROGRESS NOTES
Bette Corado is a 89 y.o. female (: 1934) presenting to address:    Chief Complaint   Patient presents with    Medication Check       Vitals:    25 1305   BP: 127/79   Pulse: 82   Resp: 16   Temp: 98.4 °F (36.9 °C)   SpO2: 97%       \"Have you been to the ER, urgent care clinic since your last visit?  Hospitalized since your last visit?\"    NO    “Have you seen or consulted any other health care providers outside of [facility] since your last visit?”    NO

## 2025-06-11 NOTE — PROGRESS NOTES
[facility]    HISTORY OF PRESENT ILLNESS  Bette Corado  is a 89 y.o. y.o. female here for acute visit    R Knee pain x few weeks  -fell out of the bed about 2 months ago face forward but did not hit head  -pain present before fall, but worse post fall  -also endorses L breast pain since fall, but no bruising  -pt did not go to the ER after fall  -directs pain to posterior aspect of R knee  -wearing knee brace with some relief  -icing  -worse when getting up quickly  -gives out periodically while walking  -high risk when falls because on eliquis    A steroid injection was performed at R knee using 1% plain Lidocaine and 40 mg of Kenalog. This was well tolerated.    Prediabetes  -started metformin 500mg  -tolerating well    HYPERTENSION, Essential  Reports Compliance with meds carvedilol 3.125, losartan 25   Denies Chest pain, shortness of breath, lower extremity edema, vision changes, change in urination.    Lab Results   Component Value Date     07/18/2024    K 4.1 07/18/2024     07/18/2024    CO2 25 07/18/2024    GLUCOSE 145 (H) 07/18/2024    BUN 17 07/18/2024    CREATININE 1.05 07/18/2024    CALCIUM 9.8 07/18/2024       Afib  -carvedilol 3.125, eliquis 2.5  -follows a cardiologist in [place]     Hypothyroid   -synthroid 75    Mr#: [id number]      Past Medical History:   Diagnosis Date    A-fib (HCC)     Hearing aid worn     Hypertension        History reviewed. No pertinent surgical history.    History reviewed. No pertinent family history.    No Known Allergies    Social History     Tobacco Use   Smoking Status Never    Passive exposure: Never   Smokeless Tobacco Never       Social History     Substance and Sexual Activity   Alcohol Use Not Currently       Immunization History   Administered Date(s) Administered    Influenza, FLUAD, (age 65 y+), IM, Trivalent PF, 0.5mL 10/09/2024    Pneumococcal, PCV20, PREVNAR 20, (age 6w+), IM, 0.5mL 07/18/2024       Patient Active Problem List

## 2025-08-13 DIAGNOSIS — E03.9 HYPOTHYROIDISM, UNSPECIFIED TYPE: ICD-10-CM

## 2025-08-13 RX ORDER — LEVOTHYROXINE SODIUM 75 UG/1
75 TABLET ORAL DAILY
Qty: 90 TABLET | Refills: 1 | Status: SHIPPED | OUTPATIENT
Start: 2025-08-13